# Patient Record
Sex: MALE | Race: WHITE
[De-identification: names, ages, dates, MRNs, and addresses within clinical notes are randomized per-mention and may not be internally consistent; named-entity substitution may affect disease eponyms.]

---

## 2023-09-20 ENCOUNTER — HOSPITAL ENCOUNTER (EMERGENCY)
Dept: HOSPITAL 33 - ED | Age: 68
Discharge: HOME | End: 2023-09-20
Payer: MEDICARE

## 2023-09-20 VITALS — SYSTOLIC BLOOD PRESSURE: 135 MMHG | DIASTOLIC BLOOD PRESSURE: 65 MMHG | HEART RATE: 88 BPM

## 2023-09-20 VITALS — RESPIRATION RATE: 18 BRPM | TEMPERATURE: 97.1 F | OXYGEN SATURATION: 95 %

## 2023-09-20 DIAGNOSIS — Z79.899: ICD-10-CM
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
## 2023-09-20 DIAGNOSIS — Z23: ICD-10-CM

## 2023-09-20 DIAGNOSIS — S61.011A: Primary | ICD-10-CM

## 2023-09-20 PROCEDURE — 90471 IMMUNIZATION ADMIN: CPT

## 2023-09-20 PROCEDURE — 99282 EMERGENCY DEPT VISIT SF MDM: CPT

## 2023-09-20 PROCEDURE — 90715 TDAP VACCINE 7 YRS/> IM: CPT

## 2023-09-20 PROCEDURE — 12001 RPR S/N/AX/GEN/TRNK 2.5CM/<: CPT

## 2023-09-20 NOTE — ERPHSYRPT
- History of Present Illness


Time Seen by Provider: 09/20/23 17:07


Source: patient, family


Exam Limitations: no limitations


Physician History: 





This is a 67-year-old patient was moving plastic ties from a box using a knife. 

Patient is left-handed and he cut his dorsal aspect of his right thumb.  His 

tetanus status is not up-to-date.


Timing/Duration: today


Quality: painful


Severity: mild


Location: hands (Dorsal aspect right thumb)


Associated Symptoms: denies symptoms


Allergies/Adverse Reactions: 








codeine Adverse Reaction (Intermediate, Verified 09/20/23 17:20)


   upset stomach





Home Medications: 








Aspirin [Aspirin EC] 81 mg PO DAILY 09/20/23 [History]


Atorvastatin Calcium [Lipitor] 20 mg PO DAILY 09/20/23 [History]








Travel Risk





- International Travel


Have you traveled outside of the country in past 3 weeks: No





- Coronavirus Screening


Are you exhibiting any of the following symptoms?: No


Close contact with a COVID-19 positive Pt in past 14-21 Days: No





- Review of Systems


Constitutional: No Symptoms


Eyes: No Symptoms


Ears, Nose, & Throat: No Symptoms


Respiratory: No Symptoms


Cardiac: No Symptoms


Abdominal/Gastrointestinal: No Symptoms


Genitourinary Symptoms: No Symptoms


Musculoskeletal: No Symptoms


Skin: Other (2.5 cm curvilinear laceration dorsal aspect left thumb)


Neurological: No Symptoms


Psychological: No Symptoms


Endocrine: No Symptoms


Hematologic/Lymphatic: No Symptoms


Immunological/Allergic: No Symptoms


All Other Systems: Reviewed and Negative





- Past Medical History


Pertinent Past Medical History: No





- Past Surgical History


Past Surgical History: No





- Nursing Vital Signs


Nursing Vital Signs: 


                               Initial Vital Signs











Temperature  97.1 F   09/20/23 17:14


 


Pulse Rate  75   09/20/23 17:14


 


Respiratory Rate  18   09/20/23 17:14


 


Blood Pressure  133/67   09/20/23 17:14


 


O2 Sat by Pulse Oximetry  95   09/20/23 17:14








                                   Pain Scale











Pain Intensity                 0

















- Physical Exam


General Appearance: no apparent distress, alert, anxiety


Eye Exam: PERRL/EOMI, eyes nml inspection


Ears, Nose, Throat Exam: normal ENT inspection, moist mucous membranes


Neck Exam: normal inspection, non-tender, supple, full range of motion


Respiratory Exam: airway intact, No chest tenderness, No respiratory distress


Gastrointestinal/Abdomen Exam: No tenderness


Rectal Exam: not done


Back Exam: normal inspection, normal range of motion, No CVA tenderness, No 

vertebral tenderness


Extremity Exam: normal range of motion, pelvis stable, lacerations (Single, 

curvilinear 2.5 cm laceration dorsal aspect right thumb), tenderness (Mild right

 thumb laceration)


Neurologic Exam: alert, oriented x 3, cooperative, CNs II-XII nml as tested, 

normal mood/affect, nml cerebellar function, nml station & gait, sensation nml


Skin Exam: laceration (Curvilinear)


Lymphatic Exam: No adenopathy ( laceration dorsal aspect right thumb)


**SpO2 Interpretation**: normal


O2 Delivery: Room Air





Procedures





- Laceration/Wound Repair


  ** Right Dorsal Finger


Time of Procedure: 18:00


Wound Location: Right, hand (Dorsal aspect right thumb)


Wound Length (cm): 2.5


Wound's Depth, Shape: superficial, linear, into subcut


Wound Explored: clean (Wound explored to the base.  He was explored in a 

bloodless field and no foreign body noted)


Irrigated: Yes


Hibiclens Prep: Yes


Anesthesia: 1% Lidocaine (3 cc)


Volume Anesthetic (ccs): 3


Wound Repaired With: sutures


Suture Size/Type: 3-0, prolene


Number of Sutures: 3


Layer Closure?: No


Progress: 





09/20/23 18:24


Patient tolerated the procedure well.  There were no complications.  Pressure 

dressing was applied by the nurse after cleansing the site and apply a thin 

layer of bacitracin ointment to it.





- Course


Nursing assessment & vital signs reviewed: Yes


Ordered Tests: 


Medication Summary














Discontinued Medications














Generic Name Dose Route Start Last Admin





  Trade Name Freq  PRN Reason Stop Dose Admin


 


Bacitracin Zinc  0.9 each  09/20/23 17:42  09/20/23 18:10





  Bacitracin Packet 1 Each Pckt  TP  09/20/23 17:43  0.9 each





  STAT ONE   Administration


 


Bacitracin Zinc  Confirm  09/20/23 18:08 





  Bacitracin Packet 1 Each Pckt  Administered  09/20/23 18:09 





  Dose  





  1 each  





  .ROUTE  





  .STK-MED ONE  


 


Diphtheria/Tetanus/Acell Pertussis  0.5 ml  09/20/23 17:42  09/20/23 18:10





  Tdap --Diph,Pertuss(Acell),Tet Vac/Pf 0.5 Ml Vial  IM  09/20/23 17:43  0.5 ml





  .ONCE ONE   Administration


 


Diphtheria/Tetanus/Acell Pertussis  Confirm  09/20/23 18:08 





  Tdap --Diph,Pertuss(Acell),Tet Vac/Pf 0.5 Ml Vial  Administered  09/20/23 

18:09 





  Dose  





  0.5 ml  





  IM  





  .STK-MED ONE  














- Progress


Progress: improved, pain not gone completely, re-examined


Progress Note: 





09/20/23 18:25


This patient's medical issue is 1 of low complexity.  The level of complexity in

 the work-up performed is based on review of the patient's past medical history,

 review of the patient's medication list, review the patient's drug allergy 

list, history of present illness and physical findings on examination.


Counseled pt/family regarding: diagnosis, need for follow-up





Medical Desision Making





- Diagnostic Testing


Diagnostic test were ordered, analyzed, and reviewed by me: No





- Risk of complications


Minimal Risk: Minimal risk of morbidity





- Departure


Departure Disposition: Home


Clinical Impression: 


 Thumb laceration





Condition: Stable


Critical Care Time: No


Additional Instructions: 


Keep current pressure dressing in place until the evening of 9/21/2023.  At that

 time, remove the dressing and wash the site daily thereafter.  After each 

washing, blot dry use a hair dryer to dry the site.  Apply thin layer of 

antibiotic ointment and rebandage the site.  Suture removal in 8 to 10 days.